# Patient Record
Sex: MALE | Race: WHITE | NOT HISPANIC OR LATINO | Employment: OTHER | URBAN - METROPOLITAN AREA
[De-identification: names, ages, dates, MRNs, and addresses within clinical notes are randomized per-mention and may not be internally consistent; named-entity substitution may affect disease eponyms.]

---

## 2017-07-31 ENCOUNTER — HOSPITAL ENCOUNTER (OUTPATIENT)
Dept: ULTRASOUND IMAGING | Facility: HOSPITAL | Age: 72
Discharge: HOME | End: 2017-07-31
Attending: FAMILY MEDICINE | Admitting: FAMILY MEDICINE

## 2017-11-28 ENCOUNTER — HOSPITAL ENCOUNTER (OUTPATIENT)
Dept: FAMILY MEDICINE CLINIC | Facility: CLINIC | Age: 72
Setting detail: SPECIMEN
Discharge: HOME | End: 2017-11-28
Attending: NURSE PRACTITIONER | Admitting: NURSE PRACTITIONER

## 2022-01-01 ENCOUNTER — HOSPITAL ENCOUNTER (EMERGENCY)
Facility: HOSPITAL | Age: 77
End: 2022-12-05
Attending: EMERGENCY MEDICINE | Admitting: EMERGENCY MEDICINE

## 2022-01-01 ENCOUNTER — APPOINTMENT (OUTPATIENT)
Dept: GENERAL RADIOLOGY | Facility: HOSPITAL | Age: 77
End: 2022-01-01

## 2022-01-01 VITALS
DIASTOLIC BLOOD PRESSURE: 55 MMHG | TEMPERATURE: 100.1 F | BODY MASS INDEX: 40.24 KG/M2 | RESPIRATION RATE: 28 BRPM | WEIGHT: 241.5 LBS | HEIGHT: 65 IN | SYSTOLIC BLOOD PRESSURE: 77 MMHG | HEART RATE: 36 BPM

## 2022-01-01 DIAGNOSIS — J18.9 PNEUMONIA DUE TO INFECTIOUS ORGANISM, UNSPECIFIED LATERALITY, UNSPECIFIED PART OF LUNG: Primary | ICD-10-CM

## 2022-01-01 DIAGNOSIS — J96.01 ACUTE RESPIRATORY FAILURE WITH HYPOXIA: ICD-10-CM

## 2022-01-01 DIAGNOSIS — R65.21 SEPTIC SHOCK: ICD-10-CM

## 2022-01-01 DIAGNOSIS — A41.9 SEPTIC SHOCK: ICD-10-CM

## 2022-01-01 LAB
ABO GROUP BLD: NORMAL
ALBUMIN SERPL-MCNC: 3.4 G/DL (ref 3.5–5.2)
ALBUMIN/GLOB SERPL: 0.9 G/DL
ALP SERPL-CCNC: 326 U/L (ref 39–117)
ALT SERPL W P-5'-P-CCNC: 109 U/L (ref 1–41)
ANION GAP SERPL CALCULATED.3IONS-SCNC: 20 MMOL/L (ref 5–15)
AST SERPL-CCNC: 118 U/L (ref 1–40)
BASOPHILS # BLD AUTO: 0 10*3/MM3 (ref 0–0.2)
BASOPHILS NFR BLD AUTO: 0.2 % (ref 0–1.5)
BILIRUB SERPL-MCNC: 4 MG/DL (ref 0–1.2)
BLD GP AB SCN SERPL QL: NEGATIVE
BUN SERPL-MCNC: 56 MG/DL (ref 8–23)
BUN/CREAT SERPL: 20.1 (ref 7–25)
CALCIUM SPEC-SCNC: 8.6 MG/DL (ref 8.6–10.5)
CHLORIDE SERPL-SCNC: 98 MMOL/L (ref 98–107)
CO2 SERPL-SCNC: 25 MMOL/L (ref 22–29)
CREAT SERPL-MCNC: 2.79 MG/DL (ref 0.76–1.27)
D-LACTATE SERPL-SCNC: 2.3 MMOL/L (ref 0.5–2)
D-LACTATE SERPL-SCNC: 4.1 MMOL/L (ref 0.5–2)
DEPRECATED RDW RBC AUTO: 49.9 FL (ref 37–54)
EGFRCR SERPLBLD CKD-EPI 2021: 22.5 ML/MIN/1.73
EOSINOPHIL # BLD AUTO: 0 10*3/MM3 (ref 0–0.4)
EOSINOPHIL NFR BLD AUTO: 0.1 % (ref 0.3–6.2)
ERYTHROCYTE [DISTWIDTH] IN BLOOD BY AUTOMATED COUNT: 18.2 % (ref 12.3–15.4)
FLUAV SUBTYP SPEC NAA+PROBE: NOT DETECTED
FLUBV RNA ISLT QL NAA+PROBE: NOT DETECTED
GLOBULIN UR ELPH-MCNC: 3.6 GM/DL
GLUCOSE SERPL-MCNC: 94 MG/DL (ref 65–99)
HCT VFR BLD AUTO: 33.5 % (ref 37.5–51)
HGB BLD-MCNC: 10.2 G/DL (ref 13–17.7)
HOLD SPECIMEN: NORMAL
HOLD SPECIMEN: NORMAL
LYMPHOCYTES # BLD AUTO: 0.5 10*3/MM3 (ref 0.7–3.1)
LYMPHOCYTES NFR BLD AUTO: 2.4 % (ref 19.6–45.3)
MAGNESIUM SERPL-MCNC: 1.9 MG/DL (ref 1.6–2.4)
MCH RBC QN AUTO: 22.3 PG (ref 26.6–33)
MCHC RBC AUTO-ENTMCNC: 30.4 G/DL (ref 31.5–35.7)
MCV RBC AUTO: 73.3 FL (ref 79–97)
MONOCYTES # BLD AUTO: 0.4 10*3/MM3 (ref 0.1–0.9)
MONOCYTES NFR BLD AUTO: 2 % (ref 5–12)
NEUTROPHILS NFR BLD AUTO: 19.2 10*3/MM3 (ref 1.7–7)
NEUTROPHILS NFR BLD AUTO: 95.3 % (ref 42.7–76)
NRBC BLD AUTO-RTO: 0.1 /100 WBC (ref 0–0.2)
PLAT MORPH BLD: NORMAL
PLATELET # BLD AUTO: 160 10*3/MM3 (ref 140–450)
PMV BLD AUTO: 8.4 FL (ref 6–12)
POTASSIUM SERPL-SCNC: 4.3 MMOL/L (ref 3.5–5.2)
PROCALCITONIN SERPL-MCNC: 39.7 NG/ML (ref 0–0.25)
PROT SERPL-MCNC: 7 G/DL (ref 6–8.5)
RBC # BLD AUTO: 4.56 10*6/MM3 (ref 4.14–5.8)
RBC MORPH BLD: NORMAL
RH BLD: POSITIVE
SARS-COV-2 RNA PNL SPEC NAA+PROBE: NOT DETECTED
SODIUM SERPL-SCNC: 143 MMOL/L (ref 136–145)
T&S EXPIRATION DATE: NORMAL
WBC MORPH BLD: NORMAL
WBC NRBC COR # BLD: 20.2 10*3/MM3 (ref 3.4–10.8)
WHOLE BLOOD HOLD COAG: NORMAL
WHOLE BLOOD HOLD SPECIMEN: NORMAL

## 2022-01-01 PROCEDURE — 84145 PROCALCITONIN (PCT): CPT | Performed by: EMERGENCY MEDICINE

## 2022-01-01 PROCEDURE — 86900 BLOOD TYPING SEROLOGIC ABO: CPT | Performed by: EMERGENCY MEDICINE

## 2022-01-01 PROCEDURE — 25010000002 CEFEPIME PER 500 MG: Performed by: EMERGENCY MEDICINE

## 2022-01-01 PROCEDURE — 25010000002 VANCOMYCIN 10 G RECONSTITUTED SOLUTION: Performed by: EMERGENCY MEDICINE

## 2022-01-01 PROCEDURE — 87502 INFLUENZA DNA AMP PROBE: CPT | Performed by: EMERGENCY MEDICINE

## 2022-01-01 PROCEDURE — 80053 COMPREHEN METABOLIC PANEL: CPT

## 2022-01-01 PROCEDURE — 87635 SARS-COV-2 COVID-19 AMP PRB: CPT | Performed by: EMERGENCY MEDICINE

## 2022-01-01 PROCEDURE — 87150 DNA/RNA AMPLIFIED PROBE: CPT

## 2022-01-01 PROCEDURE — 83735 ASSAY OF MAGNESIUM: CPT | Performed by: EMERGENCY MEDICINE

## 2022-01-01 PROCEDURE — 86900 BLOOD TYPING SEROLOGIC ABO: CPT

## 2022-01-01 PROCEDURE — 96366 THER/PROPH/DIAG IV INF ADDON: CPT

## 2022-01-01 PROCEDURE — 85007 BL SMEAR W/DIFF WBC COUNT: CPT

## 2022-01-01 PROCEDURE — 87077 CULTURE AEROBIC IDENTIFY: CPT

## 2022-01-01 PROCEDURE — 96365 THER/PROPH/DIAG IV INF INIT: CPT

## 2022-01-01 PROCEDURE — 87186 SC STD MICRODIL/AGAR DIL: CPT

## 2022-01-01 PROCEDURE — 86901 BLOOD TYPING SEROLOGIC RH(D): CPT

## 2022-01-01 PROCEDURE — 86850 RBC ANTIBODY SCREEN: CPT | Performed by: EMERGENCY MEDICINE

## 2022-01-01 PROCEDURE — 86901 BLOOD TYPING SEROLOGIC RH(D): CPT | Performed by: EMERGENCY MEDICINE

## 2022-01-01 PROCEDURE — 25010000002 LORAZEPAM PER 2 MG: Performed by: NURSE PRACTITIONER

## 2022-01-01 PROCEDURE — 36415 COLL VENOUS BLD VENIPUNCTURE: CPT | Performed by: EMERGENCY MEDICINE

## 2022-01-01 PROCEDURE — 25010000002 MORPHINE PER 10 MG: Performed by: NURSE PRACTITIONER

## 2022-01-01 PROCEDURE — 83605 ASSAY OF LACTIC ACID: CPT

## 2022-01-01 PROCEDURE — 85025 COMPLETE CBC W/AUTO DIFF WBC: CPT

## 2022-01-01 PROCEDURE — 71045 X-RAY EXAM CHEST 1 VIEW: CPT

## 2022-01-01 PROCEDURE — 96375 TX/PRO/DX INJ NEW DRUG ADDON: CPT

## 2022-01-01 PROCEDURE — 93005 ELECTROCARDIOGRAM TRACING: CPT

## 2022-01-01 PROCEDURE — 99285 EMERGENCY DEPT VISIT HI MDM: CPT

## 2022-01-01 RX ORDER — SODIUM CHLORIDE 9 MG/ML
40 INJECTION, SOLUTION INTRAVENOUS AS NEEDED
Status: DISCONTINUED | OUTPATIENT
Start: 2022-01-01 | End: 2022-01-01 | Stop reason: HOSPADM

## 2022-01-01 RX ORDER — GLYCOPYRROLATE 0.2 MG/ML
0.1 INJECTION INTRAMUSCULAR; INTRAVENOUS
Status: DISCONTINUED | OUTPATIENT
Start: 2022-01-01 | End: 2022-01-01 | Stop reason: HOSPADM

## 2022-01-01 RX ORDER — ONDANSETRON 4 MG/1
4 TABLET, FILM COATED ORAL EVERY 6 HOURS PRN
Status: DISCONTINUED | OUTPATIENT
Start: 2022-01-01 | End: 2022-01-01

## 2022-01-01 RX ORDER — SODIUM CHLORIDE 0.9 % (FLUSH) 0.9 %
10 SYRINGE (ML) INJECTION AS NEEDED
Status: DISCONTINUED | OUTPATIENT
Start: 2022-01-01 | End: 2022-01-01 | Stop reason: HOSPADM

## 2022-01-01 RX ORDER — ETOMIDATE 2 MG/ML
INJECTION INTRAVENOUS
Status: COMPLETED | OUTPATIENT
Start: 2022-01-01 | End: 2022-01-01

## 2022-01-01 RX ORDER — LORAZEPAM 2 MG/ML
1 INJECTION INTRAMUSCULAR
Status: DISCONTINUED | OUTPATIENT
Start: 2022-01-01 | End: 2022-01-01 | Stop reason: HOSPADM

## 2022-01-01 RX ORDER — SODIUM CHLORIDE 0.9 % (FLUSH) 0.9 %
3 SYRINGE (ML) INJECTION EVERY 12 HOURS SCHEDULED
Status: DISCONTINUED | OUTPATIENT
Start: 2022-01-01 | End: 2022-01-01 | Stop reason: HOSPADM

## 2022-01-01 RX ORDER — ACETAMINOPHEN 650 MG/1
650 SUPPOSITORY RECTAL ONCE
Status: COMPLETED | OUTPATIENT
Start: 2022-01-01 | End: 2022-01-01

## 2022-01-01 RX ORDER — SUCCINYLCHOLINE CHLORIDE 20 MG/ML
INJECTION INTRAMUSCULAR; INTRAVENOUS
Status: DISCONTINUED
Start: 2022-01-01 | End: 2022-01-01 | Stop reason: HOSPADM

## 2022-01-01 RX ORDER — SODIUM CHLORIDE 0.9 % (FLUSH) 0.9 %
3-10 SYRINGE (ML) INJECTION AS NEEDED
Status: DISCONTINUED | OUTPATIENT
Start: 2022-01-01 | End: 2022-01-01 | Stop reason: HOSPADM

## 2022-01-01 RX ORDER — ONDANSETRON 2 MG/ML
4 INJECTION INTRAMUSCULAR; INTRAVENOUS EVERY 6 HOURS PRN
Status: DISCONTINUED | OUTPATIENT
Start: 2022-01-01 | End: 2022-01-01

## 2022-01-01 RX ORDER — ETOMIDATE 2 MG/ML
INJECTION INTRAVENOUS
Status: DISCONTINUED
Start: 2022-01-01 | End: 2022-01-01 | Stop reason: HOSPADM

## 2022-01-01 RX ADMIN — GLYCOPYRROLATE 0.1 MG: 0.2 INJECTION INTRAMUSCULAR; INTRAVENOUS at 15:25

## 2022-01-01 RX ADMIN — SODIUM CHLORIDE 500 ML: 9 INJECTION, SOLUTION INTRAVENOUS at 10:14

## 2022-01-01 RX ADMIN — ACETAMINOPHEN 650 MG: 650 SUPPOSITORY RECTAL at 11:05

## 2022-01-01 RX ADMIN — SODIUM CHLORIDE 1845 ML: 9 INJECTION, SOLUTION INTRAVENOUS at 11:18

## 2022-01-01 RX ADMIN — LORAZEPAM 1 MG: 2 INJECTION INTRAMUSCULAR; INTRAVENOUS at 15:46

## 2022-01-01 RX ADMIN — CEFEPIME 2 G: 2 INJECTION, POWDER, FOR SOLUTION INTRAVENOUS at 10:49

## 2022-01-01 RX ADMIN — VANCOMYCIN HYDROCHLORIDE 2000 MG: 10 INJECTION, POWDER, LYOPHILIZED, FOR SOLUTION INTRAVENOUS at 11:29

## 2022-01-01 RX ADMIN — MORPHINE SULFATE 4 MG: 4 INJECTION INTRAVENOUS at 15:47

## 2022-01-01 RX ADMIN — Medication 3 ML: at 15:36

## 2022-01-01 RX ADMIN — SODIUM CHLORIDE 500 ML: 9 INJECTION, SOLUTION INTRAVENOUS at 10:51

## 2022-12-05 PROBLEM — A41.9 SEPSIS (HCC): Status: ACTIVE | Noted: 2022-01-01

## 2022-12-05 NOTE — ED NOTES
Family is at bedside with ER provider, they have decided at this time to not intubate patient but to go on with aggressive med treatment.

## 2022-12-05 NOTE — ED PROVIDER NOTES
"Subjective   History of Present Illness  Respiratory distress  77-year-old male presents with acute respiratory distress wife states had general decline over the last few days with occasional vomiting and general weakness and became more short of breath and not responding to her this morning.  Patient is unable give any further history or review of systems  Review of Systems   Unable to perform ROS: Mental status change       Past Medical History:   Diagnosis Date   • DDD (degenerative disc disease), lumbar    • Gall stones    • Hyperlipidemia    • Hypertension    • Kidney stone    • PVD (peripheral vascular disease) ([facility])    • Renal disorder      Chronic pain, peripheral artery disease, aneurysm  No Known Allergies    Past Surgical History:   Procedure Laterality Date   • ABOVE KNEE AMPUTATION Left      Left leg amputation, abdominal aortic aneurysm  No family history on file.    Social History     Socioeconomic History   • Marital status:        Prior to Admission medications    Not on File     BP (!) 89/56   Pulse 120   Temp 100.1 °F (37.8 °C) (Rectal)   Resp 28   Ht 165.1 cm (65\")   Wt 110 kg (241 lb 8 oz)   SpO2 92%   BMI 40.19 kg/m²   I examined the patient using the appropriate personal protective equipment.        Objective   Physical Exam  General: Obese male in acute respiratory distress, altered mental status  Eyes: Pupils midsized round and reactive, sclera nonicteric  HEENT: Mucous membranes dry, no mucosal swelling  Neck: Supple, no nuchal rigidity, no soft tissue swelling or stridor  Respirations: Rhonchi diffusely throughout the lung fields bilaterally, respirations tachypneic  Heart increased rate, regular rhythm, no murmurs rubs or gallops,   Abdomen soft nontender nondistended, midline scar  Extremities left leg amputation, no pitting edema  Neuro cranial nerves grossly intact, not following commands, patient opened his eyes to voice and answered no to having any pain  Psych oriented " to person  Skin no rash  Procedures           ED Course      Results for orders placed or performed during the hospital encounter of 12/05/22   COVID-19,CEPHEID/KAYLEE,COR/VIDAL/PAD/HELEN/MAD IN-HOUSE(OR EMERGENT/ADD-ON),NP SWAB IN TRANSPORT MEDIA 3-4 HR TAT, RT-PCR - Swab, Nasopharynx    Specimen: Nasopharynx; Swab   Result Value Ref Range    COVID19 Not Detected Not Detected - Ref. Range   Influenza Antigen, Rapid - Swab, Nasopharynx    Specimen: Nasopharynx; Swab   Result Value Ref Range    Influenza A PCR Not Detected Not Detected    Influenza B PCR Not Detected Not Detected   Comprehensive Metabolic Panel    Specimen: Blood   Result Value Ref Range    Glucose 94 65 - 99 mg/dL    BUN 56 (H) 8 - 23 mg/dL    Creatinine 2.79 (H) 0.76 - 1.27 mg/dL    Sodium 143 136 - 145 mmol/L    Potassium 4.3 3.5 - 5.2 mmol/L    Chloride 98 98 - 107 mmol/L    CO2 25.0 22.0 - 29.0 mmol/L    Calcium 8.6 8.6 - 10.5 mg/dL    Total Protein 7.0 6.0 - 8.5 g/dL    Albumin 3.40 (L) 3.50 - 5.20 g/dL    ALT (SGPT) 109 (H) 1 - 41 U/L    AST (SGOT) 118 (H) 1 - 40 U/L    Alkaline Phosphatase 326 (H) 39 - 117 U/L    Total Bilirubin 4.0 (H) 0.0 - 1.2 mg/dL    Globulin 3.6 gm/dL    A/G Ratio 0.9 g/dL    BUN/Creatinine Ratio 20.1 7.0 - 25.0    Anion Gap 20.0 (H) 5.0 - 15.0 mmol/L    eGFR 22.5 (L) >60.0 mL/min/1.73   CBC Auto Differential    Specimen: Blood   Result Value Ref Range    WBC 20.20 (H) 3.40 - 10.80 10*3/mm3    RBC 4.56 4.14 - 5.80 10*6/mm3    Hemoglobin 10.2 (L) 13.0 - 17.7 g/dL    Hematocrit 33.5 (L) 37.5 - 51.0 %    MCV 73.3 (L) 79.0 - 97.0 fL    MCH 22.3 (L) 26.6 - 33.0 pg    MCHC 30.4 (L) 31.5 - 35.7 g/dL    RDW 18.2 (H) 12.3 - 15.4 %    RDW-SD 49.9 37.0 - 54.0 fl    MPV 8.4 6.0 - 12.0 fL    Platelets 160 140 - 450 10*3/mm3    Neutrophil % 95.3 (H) 42.7 - 76.0 %    Lymphocyte % 2.4 (L) 19.6 - 45.3 %    Monocyte % 2.0 (L) 5.0 - 12.0 %    Eosinophil % 0.1 (L) 0.3 - 6.2 %    Basophil % 0.2 0.0 - 1.5 %    Neutrophils, Absolute 19.20 (H) 1.70  - 7.00 10*3/mm3    Lymphocytes, Absolute 0.50 (L) 0.70 - 3.10 10*3/mm3    Monocytes, Absolute 0.40 0.10 - 0.90 10*3/mm3    Eosinophils, Absolute 0.00 0.00 - 0.40 10*3/mm3    Basophils, Absolute 0.00 0.00 - 0.20 10*3/mm3    nRBC 0.1 0.0 - 0.2 /100 WBC   Procalcitonin    Specimen: Blood   Result Value Ref Range    Procalcitonin 39.70 (H) 0.00 - 0.25 ng/mL   Magnesium    Specimen: Blood   Result Value Ref Range    Magnesium 1.9 1.6 - 2.4 mg/dL   Scan Slide    Specimen: Blood   Result Value Ref Range    RBC Morphology Normal Normal    WBC Morphology Normal Normal    Platelet Morphology Normal Normal   POC Lactate    Specimen: Blood   Result Value Ref Range    Lactate 4.1 (C) 0.5 - 2.0 mmol/L   POC Lactate    Specimen: Blood   Result Value Ref Range    Lactate 2.3 (C) 0.5 - 2.0 mmol/L   ECG 12 Lead Tachycardia   Result Value Ref Range    QT Interval 338 ms   Type & Screen    Specimen: Blood   Result Value Ref Range    ABO Type O     RH type Positive     Antibody Screen Negative     T&S Expiration Date 12/8/2022 11:59:59 PM    Green Top (Gel)   Result Value Ref Range    Extra Tube Hold for add-ons.    Lavender Top   Result Value Ref Range    Extra Tube hold for add-on    Gold Top - SST   Result Value Ref Range    Extra Tube Hold for add-ons.    Light Blue Top   Result Value Ref Range    Extra Tube Hold for add-ons.      XR Chest 1 View    Result Date: 12/5/2022  1. Cardiomegaly. 2. Right basilar airspace disease which may relate to atelectasis and/or pneumonia with small right pleural effusion.  Electronically Signed By-Saw Gonzalez MD On:12/5/2022 10:36 AM This report was finalized on [id number] by  Saw Gonzalez MD.                                         MDM  My EKG interpretation atrial fibrillation, incomplete right bundle branch block, PVC, rate of 127    Additional history came from the daughter and the wife at the bedside who arrived shortly after patient's initial evaluation.  We were preparing to intubate due  to some persistent hypoxia and respiratory failure and concern for ventilatory failure.  However wife states that patient was very adamant about not wanting to be on the vent or any resuscitation.  She states he has gone so far as to tell her to never call 911 if he is dying and that he did not want any further extreme measures.  For this reason they do wish that we honor patient's verbalized wishes and not proceed with intubation.  They are agreeable to the plan of oxygen therapy and IV fluids and antibiotics.  They do voice understanding to the patient's poor prognosis and that lack of intubation and ventilatory support may worsens patient's chances to survive this illness and presentation.  Patient's x-ray findings I suspect are consistent with bacterial pneumonia as his flu and COVID screen are negative.  He is febrile hypotensive and hypoxic consistent with septic shock and respiratory failure.  Family was agreeable to IV antibiotic therapy and fluids and oxygen.  He was ordered fluid bolus for septic shock.  He has leukocytosis and elevated procalcitonin.  Blood cultures are pending.  However family main goals of therapy are for comfort care on further discussion.  Case and findings discussed with Chiara with [facility] service for admission.    Critical care time 45 minutes  Final diagnoses:   Pneumonia due to infectious organism, unspecified laterality, unspecified part of lung   Septic shock (HCC)   Acute respiratory failure with hypoxia (HCC)       ED Disposition  ED Disposition     ED Disposition   Decision to Admit    Condition   --    Comment   Level of Care: Telemetry [5]   Admitting Physician: REJI SUAREZ [[id number]]   Attending Physician: REJI SUAREZ [[id number]]               No follow-up provider specified.       Medication List      No changes were made to your prescriptions during this visit.          Taj Wolff MD  12/05/22 1458

## 2022-12-05 NOTE — CASE MANAGEMENT/SOCIAL WORK
Continued Stay Note   Jesus     Patient Name: Orlando Louis  MRN: [id number]  Today's Date: 12/5/2022    Admit Date: 12/5/2022        Discharge Plan     Row Name 12/05/22 1708       Plan    Plan Comments Met with Family at bedside. Patient wanted his body donated to science after death. For tonight he will be moved to [facility], family agreeable. Daughter to contact locations tomorrow and arrange donation.                  Jeanne Tse RN

## 2022-12-05 NOTE — H&P
Patient Care Team:  Dejuan Merritt MD as PCP - General (Family Medicine)    Chief complaint  Shortness of breath    Subjective     Patient is a 77 y.o. male who presents with with decline over the last few days that included general weakness short of breath and vomiting.  Today wife called EMS due to acute respiratory distress and not responding to her this morning.  Patient was found in the emergency room to have septic shock, acute respiratory failure with hypoxia and pneumonia.  Patient had decline was about to be intubated when family decided no further extreme measures.  On examination today patient is obtunded, hypotensive, tachycardic, diaphoretic tachycardia puenet with respiratory distress on nonrebreather.  Further discussion with wife, daughter, and granddaughter at bedside is that the patient was adamant that he did not want to go to the hospital and that he wanted to be a DO NOT RESUSCITATE.  On behalf of the family member they have decided to make the patient comfort care only.    Review of Systems   Unable to perform ROS: Patient unresponsive          History  Past Medical History:   Diagnosis Date   • DDD (degenerative disc disease), lumbar    • Gall stones    • Hyperlipidemia    • Hypertension    • Kidney stone    • PVD (peripheral vascular disease) (HCC)    • Renal disorder      Past Surgical History:   Procedure Laterality Date   • ABOVE KNEE AMPUTATION Left      No family history on file.     (Not in a hospital admission)    Allergies:  Patient has no known allergies.    Objective     Vital Signs  Temp:  [100.1 °F (37.8 °C)-102.3 °F (39.1 °C)] 100.1 °F (37.8 °C)  Heart Rate:  [116-132] 120  Resp:  [28-34] 28  BP: ()/(56-64) 89/56       Physical Exam  Vitals reviewed.   Constitutional:       General: He is in acute distress.      Comments: Unresponsive   HENT:      Head: Atraumatic.      Right Ear: External ear normal.      Left Ear: External ear normal.      Nose: Nose normal.       Mouth/Throat:      Mouth: Mucous membranes are dry.   Eyes:      General:         Right eye: No discharge.         Left eye: No discharge.   Cardiovascular:      Rate and Rhythm: Tachycardia present.      Pulses: Normal pulses.      Heart sounds: Normal heart sounds.   Pulmonary:      Effort: Tachypnea, accessory muscle usage and respiratory distress present.      Breath sounds: Rhonchi present.   Abdominal:      General: There is distension.   Musculoskeletal:         General: Swelling present. Normal range of motion.      Cervical back: Normal range of motion.      Right lower leg: Edema present.      Left lower leg: Edema present.   Skin:     General: Skin is warm.      Coloration: Skin is pale.   Neurological:      Mental Status: He is unresponsive.   Psychiatric:         Speech: He is noncommunicative.          Results Review:     Imaging Results (Last 24 Hours)     Procedure Component Value Units Date/Time    XR Chest 1 View [[id number]] Collected: 12/05/22 1034     Updated: 12/05/22 1038    Narrative:         DATE OF EXAM:   12/5/2022 10:31 AM     PROCEDURE:   XR CHEST 1 VW-     INDICATIONS:   Simple Sepsis Triage Protocol     COMPARISON:  01/24/2009     TECHNIQUE:   Portable chest radiograph.     FINDINGS:   The patient is rotated to the right. The heart is enlarged. Airspace  disease at the right lung base which may relate to atelectasis or  pneumonia. The left lung is clear. No pneumothorax. Suspect small right  pleural effusion.       Impression:      1. Cardiomegaly.  2. Right basilar airspace disease which may relate to atelectasis and/or  pneumonia with small right pleural effusion.     Electronically Signed By-Saw Gonzalez MD On:12/5/2022 10:36 AM  This report was finalized on [id number] by  Saw Gonzalez MD.           Lab Results (last 24 hours)     Procedure Component Value Units Date/Time    POC Lactate [[id number]]  (Abnormal) Collected: 12/05/22 1412    Specimen: Blood Updated: 12/05/22 1413      Lactate 2.3 mmol/L      Comment: Serial Number: [id number]:  [id number]       STAT Lactic Acid, Reflex [[id number]] Updated: 12/05/22 1358    Specimen: Blood from Arm, Left     Influenza Antigen, Rapid - Swab, Nasopharynx [[id number]]  (Normal) Collected: 12/05/22 1135    Specimen: Swab from Nasopharynx Updated: 12/05/22 1212     Influenza A PCR Not Detected     Influenza B PCR Not Detected    COVID-19,CEPHEID/KAYLEE,COR/VIDAL/PAD/HELEN/MAD IN-HOUSE(OR EMERGENT/ADD-ON),NP SWAB IN TRANSPORT MEDIA 3-4 HR TAT, RT-PCR - Swab, Nasopharynx [[id number]]  (Normal) Collected: 12/05/22 1135    Specimen: Swab from Nasopharynx Updated: 12/05/22 1210     COVID19 Not Detected    Narrative:      Fact sheet for providers: https://www.fda.gov/media/131231/download     Fact sheet for patients: https://www.fda.gov/media/245919/download  Fact sheet for providers: https://www.fda.gov/media/522986/download    Fact sheet for patients: https://www.fda.gov/media/261869/download    Test performed by PCR.    [place] Draw [[id number]] Collected: 12/05/22 1018    Specimen: Blood Updated: 12/05/22 1131    Narrative:      The following orders were created for panel order [place] Draw.  Procedure                               Abnormality         Status                     ---------                               -----------         ------                     Green Top (Gel)[[id number]]                                  Final result               Lavender Top[[id number]]                                     Final result               Gold Top - SST[[id number]]                                   Final result               Light Blue Top[[id number]]                                   Final result                 Please view results for these tests on the individual orders.    Lavender Top [[id number]] Collected: 12/05/22 1018    Specimen: Blood Updated: 12/05/22 1131     Extra Tube hold for add-on     Comment: Auto resulted       Gold Top - SST [[id number]]  "Collected: 12/05/22 1018    Specimen: Blood Updated: 12/05/22 1131     Extra Tube Hold for add-ons.     Comment: Auto resulted.       Green Top (Gel) [[id number]] Collected: 12/05/22 1018    Specimen: Blood Updated: 12/05/22 1131     Extra Tube Hold for add-ons.     Comment: Auto resulted.       Light Blue Top [[id number]] Collected: 12/05/22 1018    Specimen: Blood Updated: 12/05/22 1131     Extra Tube Hold for add-ons.     Comment: Auto resulted       Procalcitonin [[id number]]  (Abnormal) Collected: 12/05/22 1018    Specimen: Blood Updated: 12/05/22 1100     Procalcitonin 39.70 ng/mL     Narrative:      As a Marker for Sepsis (Non-Neonates):    1. <0.5 ng/mL represents a low risk of severe sepsis and/or septic shock.  2. >2 ng/mL represents a high risk of severe sepsis and/or septic shock.    As a Marker for Lower Respiratory Tract Infections that require antibiotic therapy:    PCT on Admission    Antibiotic Therapy       6-12 Hrs later    >0.5                Strongly Recommended  >0.25 - <0.5        Recommended   0.1 - 0.25          Discouraged              Remeasure/reassess PCT  <0.1                Strongly Discouraged     Remeasure/reassess PCT    As 28 day mortality risk marker: \"Change in Procalcitonin Result\" (>80% or <=80%) if Day 0 (or Day 1) and Day 4 values are available. Refer to http://www.[employer]-pct-calculator.com    Change in PCT <=80%  A decrease of PCT levels below or equal to 80% defines a positive change in PCT test result representing a higher risk for 28-day all-cause mortality of patients diagnosed with severe sepsis for septic shock.    Change in PCT >80%  A decrease of PCT levels of more than 80% defines a negative change in PCT result representing a lower risk for 28-day all-cause mortality of patients diagnosed with severe sepsis or septic shock.       Comprehensive Metabolic Panel [[id number]]  (Abnormal) Collected: 12/05/22 1018    Specimen: Blood Updated: 12/05/22 1058     Glucose 94 " mg/dL      BUN 56 mg/dL      Creatinine 2.79 mg/dL      Sodium 143 mmol/L      Potassium 4.3 mmol/L      Chloride 98 mmol/L      CO2 25.0 mmol/L      Calcium 8.6 mg/dL      Total Protein 7.0 g/dL      Albumin 3.40 g/dL      ALT (SGPT) 109 U/L      AST (SGOT) 118 U/L      Alkaline Phosphatase 326 U/L      Total Bilirubin 4.0 mg/dL      Globulin 3.6 gm/dL      A/G Ratio 0.9 g/dL      BUN/Creatinine Ratio 20.1     Anion Gap 20.0 mmol/L      eGFR 22.5 mL/min/1.73      Comment: National Kidney Foundation and American Society of Nephrology (ASN) Task Force recommended calculation based on the Chronic Kidney Disease Epidemiology Collaboration (CKD-EPI) equation refit without adjustment for race.       Narrative:      GFR Normal >60  Chronic Kidney Disease <60  Kidney Failure <15    The GFR formula is only valid for adults with stable renal function between ages 18 and 70.    Magnesium [[id number]]  (Normal) Collected: 12/05/22 1018    Specimen: Blood Updated: 12/05/22 1058     Magnesium 1.9 mg/dL     Blood Culture - Blood, Hand, Left [[id number]] Collected: 12/05/22 1039    Specimen: Blood from Hand, Left Updated: 12/05/22 1044    CBC & Differential [[id number]]  (Abnormal) Collected: 12/05/22 1018    Specimen: Blood Updated: 12/05/22 1038    Narrative:      The following orders were created for panel order CBC & Differential.  Procedure                               Abnormality         Status                     ---------                               -----------         ------                     CBC Auto Differential[[id number]]        Abnormal            Final result               Scan Slide[[id number]]                   Normal              Final result                 Please view results for these tests on the individual orders.    Scan Slide [[id number]]  (Normal) Collected: 12/05/22 1018    Specimen: Blood Updated: 12/05/22 1038     RBC Morphology Normal     WBC Morphology Normal     Platelet Morphology Normal     Narrative:      Slide Reviewed     CBC Auto Differential [[id number]]  (Abnormal) Collected: 12/05/22 1018    Specimen: Blood Updated: 12/05/22 1038     WBC 20.20 10*3/mm3      RBC 4.56 10*6/mm3      Hemoglobin 10.2 g/dL      Hematocrit 33.5 %      MCV 73.3 fL      MCH 22.3 pg      MCHC 30.4 g/dL      RDW 18.2 %      RDW-SD 49.9 fl      MPV 8.4 fL      Platelets 160 10*3/mm3      Neutrophil % 95.3 %      Lymphocyte % 2.4 %      Monocyte % 2.0 %      Eosinophil % 0.1 %      Basophil % 0.2 %      Neutrophils, Absolute 19.20 10*3/mm3      Lymphocytes, Absolute 0.50 10*3/mm3      Monocytes, Absolute 0.40 10*3/mm3      Eosinophils, Absolute 0.00 10*3/mm3      Basophils, Absolute 0.00 10*3/mm3      nRBC 0.1 /100 WBC     Blood Culture - Blood, Arm, Left [[id number]] Collected: 12/05/22 1018    Specimen: Blood from Arm, Left Updated: 12/05/22 1028    POC Lactate [[id number]]  (Abnormal) Collected: 12/05/22 1023    Specimen: Blood Updated: 12/05/22 1024     Lactate 4.1 mmol/L      Comment: Serial Number: [id number]:  [id number]              I reviewed the patient's new clinical results.    Assessment & Plan     Septic shock  Acute respiratory failure with hypoxia  Pneumonia  Hypotension  Acute kidney injury  Elevated alkaline phosphate  Elevated liver function  Hypoalbuminemia  Leukocytosis    Discussion with family patient is DNR/DNI/comfort care    I discussed the patient's findings and my recommendations with patient.     Chiara Esparza, ELSA  12/05/22  14:43 EST

## 2022-12-06 LAB
BACTERIA BLD CULT: ABNORMAL
BACTERIA ID TEST ISLT QL CULT: ABNORMAL
BLOOD CULTURE ID, PCR 3: ABNORMAL
BOTTLE TYPE: ABNORMAL
QT INTERVAL: 338 MS

## 2022-12-09 LAB
BACTERIA SPEC AEROBE CULT: ABNORMAL
GRAM STN SPEC: ABNORMAL
ISOLATED FROM: ABNORMAL

## 2022-12-10 LAB
BACTERIA SPEC AEROBE CULT: ABNORMAL
BACTERIA SPEC AEROBE CULT: ABNORMAL
GRAM STN SPEC: ABNORMAL
GRAM STN SPEC: ABNORMAL
ISOLATED FROM: ABNORMAL
ISOLATED FROM: ABNORMAL